# Patient Record
Sex: MALE | Race: OTHER | HISPANIC OR LATINO | URBAN - METROPOLITAN AREA
[De-identification: names, ages, dates, MRNs, and addresses within clinical notes are randomized per-mention and may not be internally consistent; named-entity substitution may affect disease eponyms.]

---

## 2018-06-27 RX ORDER — AZTREONAM 2 G
1 VIAL (EA) INJECTION
Qty: 0 | Refills: 0 | COMMUNITY
Start: 2018-06-27 | End: 2018-07-04

## 2018-07-01 ENCOUNTER — INPATIENT (INPATIENT)
Facility: HOSPITAL | Age: 73
LOS: 1 days | Discharge: HOME | End: 2018-07-03
Attending: INTERNAL MEDICINE | Admitting: INTERNAL MEDICINE
Payer: MEDICARE

## 2018-07-01 VITALS
DIASTOLIC BLOOD PRESSURE: 80 MMHG | TEMPERATURE: 98 F | RESPIRATION RATE: 14 BRPM | HEART RATE: 93 BPM | OXYGEN SATURATION: 100 % | SYSTOLIC BLOOD PRESSURE: 134 MMHG

## 2018-07-01 DIAGNOSIS — N17.9 ACUTE KIDNEY FAILURE, UNSPECIFIED: ICD-10-CM

## 2018-07-01 DIAGNOSIS — R55 SYNCOPE AND COLLAPSE: ICD-10-CM

## 2018-07-01 DIAGNOSIS — I10 ESSENTIAL (PRIMARY) HYPERTENSION: ICD-10-CM

## 2018-07-01 DIAGNOSIS — Z29.9 ENCOUNTER FOR PROPHYLACTIC MEASURES, UNSPECIFIED: ICD-10-CM

## 2018-07-01 DIAGNOSIS — N40.0 BENIGN PROSTATIC HYPERPLASIA WITHOUT LOWER URINARY TRACT SYMPTOMS: ICD-10-CM

## 2018-07-01 DIAGNOSIS — Z41.9 ENCOUNTER FOR PROCEDURE FOR PURPOSES OTHER THAN REMEDYING HEALTH STATE, UNSPECIFIED: Chronic | ICD-10-CM

## 2018-07-01 DIAGNOSIS — Z87.891 PERSONAL HISTORY OF NICOTINE DEPENDENCE: ICD-10-CM

## 2018-07-01 LAB
ALBUMIN SERPL ELPH-MCNC: 4.1 G/DL — SIGNIFICANT CHANGE UP (ref 3.3–5)
ALP SERPL-CCNC: 71 U/L — SIGNIFICANT CHANGE UP (ref 40–120)
ALT FLD-CCNC: 16 U/L — SIGNIFICANT CHANGE UP (ref 4–41)
APPEARANCE UR: CLEAR — SIGNIFICANT CHANGE UP
AST SERPL-CCNC: 20 U/L — SIGNIFICANT CHANGE UP (ref 4–40)
BASOPHILS # BLD AUTO: 0.04 K/UL — SIGNIFICANT CHANGE UP (ref 0–0.2)
BASOPHILS NFR BLD AUTO: 0.3 % — SIGNIFICANT CHANGE UP (ref 0–2)
BILIRUB SERPL-MCNC: 0.2 MG/DL — SIGNIFICANT CHANGE UP (ref 0.2–1.2)
BILIRUB UR-MCNC: NEGATIVE — SIGNIFICANT CHANGE UP
BLOOD UR QL VISUAL: NEGATIVE — SIGNIFICANT CHANGE UP
BUN SERPL-MCNC: 26 MG/DL — HIGH (ref 7–23)
CALCIUM SERPL-MCNC: 8.9 MG/DL — SIGNIFICANT CHANGE UP (ref 8.4–10.5)
CHLORIDE SERPL-SCNC: 101 MMOL/L — SIGNIFICANT CHANGE UP (ref 98–107)
CHOLEST SERPL-MCNC: 159 MG/DL — SIGNIFICANT CHANGE UP (ref 120–199)
CO2 SERPL-SCNC: 20 MMOL/L — LOW (ref 22–31)
COLOR SPEC: YELLOW — SIGNIFICANT CHANGE UP
CREAT ?TM UR-MCNC: 248 MG/DL — SIGNIFICANT CHANGE UP
CREAT SERPL-MCNC: 2.12 MG/DL — HIGH (ref 0.5–1.3)
EOSINOPHIL # BLD AUTO: 0.03 K/UL — SIGNIFICANT CHANGE UP (ref 0–0.5)
EOSINOPHIL NFR BLD AUTO: 0.2 % — SIGNIFICANT CHANGE UP (ref 0–6)
GLUCOSE SERPL-MCNC: 136 MG/DL — HIGH (ref 70–99)
GLUCOSE UR-MCNC: NEGATIVE — SIGNIFICANT CHANGE UP
HBA1C BLD-MCNC: 5.9 % — HIGH (ref 4–5.6)
HCT VFR BLD CALC: 42 % — SIGNIFICANT CHANGE UP (ref 39–50)
HDLC SERPL-MCNC: 29 MG/DL — LOW (ref 35–55)
HGB BLD-MCNC: 13.6 G/DL — SIGNIFICANT CHANGE UP (ref 13–17)
IMM GRANULOCYTES # BLD AUTO: 0.12 # — SIGNIFICANT CHANGE UP
IMM GRANULOCYTES NFR BLD AUTO: 0.8 % — SIGNIFICANT CHANGE UP (ref 0–1.5)
KETONES UR-MCNC: SIGNIFICANT CHANGE UP
LEUKOCYTE ESTERASE UR-ACNC: NEGATIVE — SIGNIFICANT CHANGE UP
LIPID PNL WITH DIRECT LDL SERPL: 102 MG/DL — SIGNIFICANT CHANGE UP
LYMPHOCYTES # BLD AUTO: 1.1 K/UL — SIGNIFICANT CHANGE UP (ref 1–3.3)
LYMPHOCYTES # BLD AUTO: 7.1 % — LOW (ref 13–44)
MCHC RBC-ENTMCNC: 27.2 PG — SIGNIFICANT CHANGE UP (ref 27–34)
MCHC RBC-ENTMCNC: 32.4 % — SIGNIFICANT CHANGE UP (ref 32–36)
MCV RBC AUTO: 84 FL — SIGNIFICANT CHANGE UP (ref 80–100)
MONOCYTES # BLD AUTO: 0.59 K/UL — SIGNIFICANT CHANGE UP (ref 0–0.9)
MONOCYTES NFR BLD AUTO: 3.8 % — SIGNIFICANT CHANGE UP (ref 2–14)
NEUTROPHILS # BLD AUTO: 13.63 K/UL — HIGH (ref 1.8–7.4)
NEUTROPHILS NFR BLD AUTO: 87.8 % — HIGH (ref 43–77)
NITRITE UR-MCNC: NEGATIVE — SIGNIFICANT CHANGE UP
NON-SQ EPI CELLS # UR AUTO: <1 — SIGNIFICANT CHANGE UP
NRBC # FLD: 0 — SIGNIFICANT CHANGE UP
PH UR: 5.5 — SIGNIFICANT CHANGE UP (ref 4.6–8)
PLATELET # BLD AUTO: 251 K/UL — SIGNIFICANT CHANGE UP (ref 150–400)
PMV BLD: 10.5 FL — SIGNIFICANT CHANGE UP (ref 7–13)
POTASSIUM SERPL-MCNC: 5 MMOL/L — SIGNIFICANT CHANGE UP (ref 3.5–5.3)
POTASSIUM SERPL-SCNC: 5 MMOL/L — SIGNIFICANT CHANGE UP (ref 3.5–5.3)
PROT SERPL-MCNC: 7.1 G/DL — SIGNIFICANT CHANGE UP (ref 6–8.3)
PROT UR-MCNC: 10 MG/DL — SIGNIFICANT CHANGE UP
RBC # BLD: 5 M/UL — SIGNIFICANT CHANGE UP (ref 4.2–5.8)
RBC # FLD: 15.1 % — HIGH (ref 10.3–14.5)
RBC CASTS # UR COMP ASSIST: SIGNIFICANT CHANGE UP (ref 0–?)
SODIUM SERPL-SCNC: 136 MMOL/L — SIGNIFICANT CHANGE UP (ref 135–145)
SODIUM UR-SCNC: 81 MMOL/L — SIGNIFICANT CHANGE UP
SP GR SPEC: 1.02 — SIGNIFICANT CHANGE UP (ref 1–1.04)
TRIGL SERPL-MCNC: 242 MG/DL — HIGH (ref 10–149)
TROPONIN T, HIGH SENSITIVITY: 14 NG/L — SIGNIFICANT CHANGE UP (ref ?–14)
TROPONIN T, HIGH SENSITIVITY: 15 NG/L — SIGNIFICANT CHANGE UP (ref ?–14)
TSH SERPL-MCNC: 3.11 UIU/ML — SIGNIFICANT CHANGE UP (ref 0.27–4.2)
UROBILINOGEN FLD QL: 1 MG/DL — SIGNIFICANT CHANGE UP
WBC # BLD: 15.51 K/UL — HIGH (ref 3.8–10.5)
WBC # FLD AUTO: 15.51 K/UL — HIGH (ref 3.8–10.5)
WBC UR QL: SIGNIFICANT CHANGE UP (ref 0–?)

## 2018-07-01 PROCEDURE — 71046 X-RAY EXAM CHEST 2 VIEWS: CPT | Mod: 26

## 2018-07-01 RX ORDER — SPIRONOLACTONE 25 MG/1
25 TABLET, FILM COATED ORAL DAILY
Qty: 0 | Refills: 0 | Status: DISCONTINUED | OUTPATIENT
Start: 2018-07-01 | End: 2018-07-01

## 2018-07-01 RX ORDER — LOSARTAN POTASSIUM 100 MG/1
25 TABLET, FILM COATED ORAL DAILY
Qty: 0 | Refills: 0 | Status: DISCONTINUED | OUTPATIENT
Start: 2018-07-01 | End: 2018-07-03

## 2018-07-01 RX ORDER — TAMSULOSIN HYDROCHLORIDE 0.4 MG/1
0.4 CAPSULE ORAL AT BEDTIME
Qty: 0 | Refills: 0 | Status: DISCONTINUED | OUTPATIENT
Start: 2018-07-01 | End: 2018-07-03

## 2018-07-01 RX ORDER — LOSARTAN POTASSIUM 100 MG/1
1 TABLET, FILM COATED ORAL
Qty: 0 | Refills: 0 | COMMUNITY

## 2018-07-01 RX ORDER — SODIUM CHLORIDE 9 MG/ML
1000 INJECTION INTRAMUSCULAR; INTRAVENOUS; SUBCUTANEOUS
Qty: 0 | Refills: 0 | Status: COMPLETED | OUTPATIENT
Start: 2018-07-01 | End: 2018-07-01

## 2018-07-01 RX ORDER — TAMSULOSIN HYDROCHLORIDE 0.4 MG/1
1 CAPSULE ORAL
Qty: 0 | Refills: 0 | COMMUNITY

## 2018-07-01 RX ADMIN — TAMSULOSIN HYDROCHLORIDE 0.4 MILLIGRAM(S): 0.4 CAPSULE ORAL at 21:15

## 2018-07-01 RX ADMIN — LOSARTAN POTASSIUM 25 MILLIGRAM(S): 100 TABLET, FILM COATED ORAL at 11:17

## 2018-07-01 RX ADMIN — SPIRONOLACTONE 25 MILLIGRAM(S): 25 TABLET, FILM COATED ORAL at 11:17

## 2018-07-01 RX ADMIN — Medication 1 TABLET(S): at 18:07

## 2018-07-01 RX ADMIN — SODIUM CHLORIDE 100 MILLILITER(S): 9 INJECTION INTRAMUSCULAR; INTRAVENOUS; SUBCUTANEOUS at 07:54

## 2018-07-01 NOTE — CONSULT NOTE ADULT - SUBJECTIVE AND OBJECTIVE BOX
Dr. Walton  Office [phone]  Cell [phone]  Diane CHRISTIE  Cell [phone]    HPI:  73 yo male PMHx HTN, Hypokalemia, Gout, BPH, s/p "prostate sx" 1 week ago, s/p passed out last night. Got admitted with syncope. Pt s/p "prostate biopsy" 1 week ago and was put on Bactrim DS x 7 days. He denies fever, chills, chest pain, sob, palpitations, nausea, vomiting, diarrhea, abdominal pain or dysuria. Has DEXTER on admission, denied use of NSAIDs or h/o renal dysfunction.      Allergies:  No Known Allergies      PAST MEDICAL & SURGICAL HISTORY:  BPH (benign prostatic hyperplasia)  Gout  Hypokalemia  Hypertension  Elective surgical procedure ;prostate procedure       Home Medications Reviewed    Hospital Medications:   MEDICATIONS  (STANDING):  losartan 25 milliGRAM(s) Oral daily  spironolactone 25 milliGRAM(s) Oral daily  tamsulosin 0.4 milliGRAM(s) Oral at bedtime  trimethoprim  160 mG/sulfamethoxazole 800 mG 1 Tablet(s) Oral two times a day      SOCIAL HISTORY:  Denies ETOh, Smoking,     FAMILY HISTORY:      REVIEW OF SYSTEMS:  CONSTITUTIONAL: No weakness, fevers or chills  EYES/ENT: No visual changes;  No vertigo or throat pain   NECK: No pain or stiffness  RESPIRATORY: No cough, wheezing, hemoptysis; No shortness of breath  CARDIOVASCULAR: No chest pain or palpitations.  GASTROINTESTINAL: No abdominal or epigastric pain. No nausea, vomiting, or hematemesis; No diarrhea or constipation. No melena or hematochezia.  GENITOURINARY: No dysuria, frequency, foamy urine, urinary urgency, incontinence or hematuria  NEUROLOGICAL: No numbness or weakness  SKIN: No itching, burning, rashes, or lesions   VASCULAR: No bilateral lower extremity edema.   All other review of systems is negative unless indicated above.    VITALS:  T(F): 97.8 (07-01-18 @ 09:15), Max: 98 (07-01-18 @ 06:39)  HR: 59 (07-01-18 @ 09:15)  BP: 148/69 (07-01-18 @ 09:15)  RR: 16 (07-01-18 @ 09:15)  SpO2: 100% (07-01-18 @ 09:15)  Wt(kg): --    Height (cm): 175.26 (07-01 @ 07:41)  Weight (kg): 99.8 (07-01 @ 07:41)  BMI (kg/m2): 32.5 (07-01 @ 07:41)  BSA (m2): 2.15 (07-01 @ 07:41)    PHYSICAL EXAM:  Constitutional: NAD  HEENT: anicteric sclera, oropharynx clear, MMM  Neck: No JVD  Respiratory: CTAB, no wheezes, rales or rhonchi  Cardiovascular: S1, S2, RRR  Gastrointestinal: BS+, soft, NT/ND  Extremities: No cyanosis or clubbing. No peripheral edema  Neurological: A/O x 3, no focal deficits  Psychiatric: Normal mood, normal affect  : No CVA tenderness. No lugo.   Skin: No rashes       LABS:  07-01    136  |  101  |  26<H>  ----------------------------<  136<H>  5.0   |  20<L>  |  2.12<H>    Ca    8.9      01 Jul 2018 04:15    TPro  7.1  /  Alb  4.1  /  TBili  0.2  /  DBili      /  AST  20  /  ALT  16  /  AlkPhos  71  07-01    Creatinine Trend: 2.12 <--                        13.6   15.51 )-----------( 251      ( 01 Jul 2018 04:15 )             42.0     Urine Studies:        RADIOLOGY & ADDITIONAL STUDIES:

## 2018-07-01 NOTE — H&P ADULT - RS GEN PE MLT RESP DETAILS PC
no rales/no wheezes/good air movement/airway patent/clear to auscultation bilaterally/breath sounds equal/no chest wall tenderness/no rhonchi/respirations non-labored

## 2018-07-01 NOTE — CONSULT NOTE ADULT - PROBLEM SELECTOR RECOMMENDATION 9
Etiology?  ATN sec to hemodynamic changes during syncope  Bactrim causing DEXTER  Recent h/o prostate biopsy, rule out obstruction  Get UA, Urine urea nitrogen, cr, Renal US, CBC with diff  Monitor renal function  Continue ARB at present, discontinue it if renal function worsens  Continue Bactrim at present

## 2018-07-01 NOTE — H&P ADULT - PROBLEM SELECTOR PLAN 1
tele monitor   cardiac enzymes x 2  Orthostatic blood pressures  IV Fluids hydration  DASH diet  TTE  UA and Urine Cx

## 2018-07-01 NOTE — H&P ADULT - NEUROLOGICAL DETAILS
sensation intact/cranial nerves intact/responds to verbal commands/alert and oriented x 3/responds to pain

## 2018-07-01 NOTE — H&P ADULT - ASSESSMENT
73 yo male PMHx HTN, Hypokalemia, Gout, BPH, s/p "prostate sx" 1 week ago, s/p diaphoresis and passed out last night admitted to tele for Syncope, r/o ACS and DEXTER.    +Syncope-->UA, Orthostatics, TTE, IVF hydration  +DEXTER--> IVF hydration, Monitor electrolytes, Trend BUN/Cr

## 2018-07-01 NOTE — ED ADULT TRIAGE NOTE - CHIEF COMPLAINT QUOTE
pt arrives to ed post 2 syncopal episode. Pt states "I started to pass out so my brother caught me". Pt denies any trauma. Pt states hx of HTN and takes potassium supplementation. Pt states "last time I passed out my potassium was low". Pt visiting from [place], drove himself up to [place]. Pt states stopped multiple times on route. Denies chest pain, SOB, palpations. No medical complaints at this time. EKG in progress. pt arrives to ed post 2 syncopal episodes. Pt states "I started to pass out so my brother caught me". Pt denies any trauma. Pt states hx of HTN and takes potassium supplementation. Pt states "last time I passed out my potassium was low". Pt visiting from [place], drove himself up to [place]. Pt states stopped multiple times on route. Denies chest pain, SOB, palpations. No medical complaints at this time. EKG in progress. Pt also states ETOH use today, 2 beers 2 shots at a family party. pt is a&ox3, clear speech.

## 2018-07-01 NOTE — ED ADULT NURSE NOTE - OBJECTIVE STATEMENT
Received pt to rm 28 with son at bedside. Pt smiling jovial affect.  st" Tonight while at party I was drinking had couple and while talking I just passed out ....I was out for few seconds....did not fall or hit head." As per Son...." This happen before when his potassium goes low....he drove up from [place] and has not taken his meds in 3 days." Pt denies chest pain, denies feeling weak or dizzy now. Pt educated to call for asst pt is fall risk precautions intiated. call bell at bedside. Pt verbalizing understanding to call for asst. Pt placed on cm=EKg done. Pt arrives with rt ac access(EMS) #20g in rt ac. Labs to follow. Arlene Cornejo at bedside.

## 2018-07-01 NOTE — ED PROVIDER NOTE - OBJECTIVE STATEMENT
72 M h/o htn, hypokalemia, p.w syncopal episode. Patient was at a party, had 2 beers, was chatting with a friend, suddenly felt sweaty and collapsed. Did not fall to floor, was caught by friend. LOC for a few seconds. No prior lightheadedness/chest pain/sob. No tongue biting/stool or urine incontinence. Currently denies any symptoms. Recently drove from [place], no leg pain or swelling. States this happened once a long time ago when he was diagnosed with hypokalemia. No recent illnesses/vomiting/diarrhea/fevers. 72 M h/o htn, hypokalemia, p/w syncopal episode. Patient was at a party, had 2 beers, was chatting with a friend, suddenly felt sweaty and collapsed. Did not fall to floor, was caught by friend. LOC for a few seconds. No prior lightheadedness/chest pain/sob. No tongue biting/stool or urine incontinence. Currently denies any symptoms. Recently drove from [place], no leg pain or swelling. States this happened once a long time ago when he was diagnosed with hypokalemia. No recent illnesses/vomiting/diarrhea/fevers.

## 2018-07-01 NOTE — CONSULT NOTE ADULT - ASSESSMENT
73 yo male PMHx HTN, Hypokalemia, Gout, BPH, s/p "prostate sx" 1 week ago, s/p passed out last night. Got admitted with syncope. Pt s/p "prostate biopsy" 1 week ago and was put on Bactrim DS x 7 days. He denies fever, chills, chest pain, sob, palpitations, nausea, vomiting, diarrhea, abdominal pain or dysuria. Has DEXTER

## 2018-07-01 NOTE — H&P ADULT - NSHPSOCIALHISTORY_GEN_ALL_CORE
Pt visiting [place] from [place], lives with son for now.   Smokes and drinks on occasion about 1 time /week. Denies drugs.

## 2018-07-01 NOTE — ED PROVIDER NOTE - MEDICAL DECISION MAKING DETAILS
72 M with syncopal episode, sweating prior but no other prodrome. concern for arrythmia. ekg, labs, cxr, likely admit for syncope w/u

## 2018-07-01 NOTE — ED ADULT NURSE NOTE - CHIEF COMPLAINT QUOTE
pt arrives to ed post 2 syncopal episodes. Pt states "I started to pass out so my brother caught me". Pt denies any trauma. Pt states hx of HTN and takes potassium supplementation. Pt states "last time I passed out my potassium was low". Pt visiting from [place], drove himself up to [place]. Pt states stopped multiple times on route. Denies chest pain, SOB, palpations. No medical complaints at this time. EKG in progress. Pt also states ETOH use today, 2 beers 2 shots at a family party. pt is a&ox3, clear speech.

## 2018-07-01 NOTE — H&P ADULT - ATTENDING COMMENTS
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

## 2018-07-01 NOTE — ED PROVIDER NOTE - ATTENDING CONTRIBUTION TO CARE
71 y/o M with h/o HTN, hypokalemia presents after a syncopal episode.  Pt was at a party tonight, had a couple beers, was standing and talking with someone when he started to feel hot.  (+)LOC, caught by friend and lowered to ground.  Pt recalls waking up with people around him.  No reported injuries.  Of note, pt lives in [place] and drove up to [place] to visit family.  Arrived earlier in the morning.  Denies any preceding chest pain, SOB, palpitations, back pain, abd pain, leg pain or swelling.  He is currently asymptomatic.  Pt reports a distant h/o syncope in the past and states he was told he had low potassium levels at the time.  Pt also reports not taking his blood pressure medication for the past 3 days as he wanted to be able to drink alcohol at the party (not a daily drinker).  Well appearing, sitting comfortably in stretcher, awake and alert, nontoxic.  VSS.  Lungs cta bl.  Cards nl S1/S2, RRR, no MRG.  Abd soft ntnd.  No pedal edema or calf tenderness, no unilateral swelling.  Plan for ekg, labs, cxr.  While hx consistent with vasovagal in setting of heat and alcohol use/poss dehydration, pt does have concerning EKG (bifasicular block), no previous for comparison and no recent cardiac work-up.  Will need admission for further syncope evaluation.

## 2018-07-01 NOTE — H&P ADULT - HISTORY OF PRESENT ILLNESS
73 yo male PMHx HTN, Hypokalemia, Gout, BPH, s/p "prostate sx" 1 week ago, s/p passed out last night. Pt was at the party, had 2 beers and 2 shots of liquor. While he was talking to his friend, he suddenly became diaphoretic and passed out for 30 secs. Friend caught him and eased him down on the chair. After 30 secs pt's LOC returned fully without incontinence and NO confusion and pt was brought to [facility] ED for evaluation. Pt s/p "prostate procedure" 1 week ago and was put on Bactrim DS x 7 days. Pt admits to non-compliance with all his meds for about 2-3 days. He denies fever, chills, chest pain, sob, palpitations, nausea, vomiting, diarrhea, abdominal pain or dysuria.

## 2018-07-02 LAB
ALBUMIN SERPL ELPH-MCNC: 3.8 G/DL — SIGNIFICANT CHANGE UP (ref 3.3–5)
ALP SERPL-CCNC: 68 U/L — SIGNIFICANT CHANGE UP (ref 40–120)
ALT FLD-CCNC: 15 U/L — SIGNIFICANT CHANGE UP (ref 4–41)
AST SERPL-CCNC: 19 U/L — SIGNIFICANT CHANGE UP (ref 4–40)
BASOPHILS # BLD AUTO: 0.05 K/UL — SIGNIFICANT CHANGE UP (ref 0–0.2)
BASOPHILS NFR BLD AUTO: 0.5 % — SIGNIFICANT CHANGE UP (ref 0–2)
BILIRUB SERPL-MCNC: 0.4 MG/DL — SIGNIFICANT CHANGE UP (ref 0.2–1.2)
BUN SERPL-MCNC: 25 MG/DL — HIGH (ref 7–23)
CALCIUM SERPL-MCNC: 8.9 MG/DL — SIGNIFICANT CHANGE UP (ref 8.4–10.5)
CHLORIDE SERPL-SCNC: 102 MMOL/L — SIGNIFICANT CHANGE UP (ref 98–107)
CO2 SERPL-SCNC: 21 MMOL/L — LOW (ref 22–31)
CREAT SERPL-MCNC: 1.56 MG/DL — HIGH (ref 0.5–1.3)
EOSINOPHIL # BLD AUTO: 0.26 K/UL — SIGNIFICANT CHANGE UP (ref 0–0.5)
EOSINOPHIL NFR BLD AUTO: 2.6 % — SIGNIFICANT CHANGE UP (ref 0–6)
GLUCOSE SERPL-MCNC: 99 MG/DL — SIGNIFICANT CHANGE UP (ref 70–99)
HCT VFR BLD CALC: 43 % — SIGNIFICANT CHANGE UP (ref 39–50)
HGB BLD-MCNC: 13.4 G/DL — SIGNIFICANT CHANGE UP (ref 13–17)
IMM GRANULOCYTES # BLD AUTO: 0.09 # — SIGNIFICANT CHANGE UP
IMM GRANULOCYTES NFR BLD AUTO: 0.9 % — SIGNIFICANT CHANGE UP (ref 0–1.5)
LYMPHOCYTES # BLD AUTO: 2.09 K/UL — SIGNIFICANT CHANGE UP (ref 1–3.3)
LYMPHOCYTES # BLD AUTO: 20.6 % — SIGNIFICANT CHANGE UP (ref 13–44)
MAGNESIUM SERPL-MCNC: 2 MG/DL — SIGNIFICANT CHANGE UP (ref 1.6–2.6)
MCHC RBC-ENTMCNC: 27.4 PG — SIGNIFICANT CHANGE UP (ref 27–34)
MCHC RBC-ENTMCNC: 31.2 % — LOW (ref 32–36)
MCV RBC AUTO: 87.9 FL — SIGNIFICANT CHANGE UP (ref 80–100)
MONOCYTES # BLD AUTO: 0.99 K/UL — HIGH (ref 0–0.9)
MONOCYTES NFR BLD AUTO: 9.8 % — SIGNIFICANT CHANGE UP (ref 2–14)
NEUTROPHILS # BLD AUTO: 6.66 K/UL — SIGNIFICANT CHANGE UP (ref 1.8–7.4)
NEUTROPHILS NFR BLD AUTO: 65.6 % — SIGNIFICANT CHANGE UP (ref 43–77)
NRBC # FLD: 0 — SIGNIFICANT CHANGE UP
PHOSPHATE SERPL-MCNC: 2.2 MG/DL — LOW (ref 2.5–4.5)
PLATELET # BLD AUTO: 231 K/UL — SIGNIFICANT CHANGE UP (ref 150–400)
PMV BLD: 11 FL — SIGNIFICANT CHANGE UP (ref 7–13)
POTASSIUM SERPL-MCNC: 4.4 MMOL/L — SIGNIFICANT CHANGE UP (ref 3.5–5.3)
POTASSIUM SERPL-SCNC: 4.4 MMOL/L — SIGNIFICANT CHANGE UP (ref 3.5–5.3)
PROT SERPL-MCNC: 6.6 G/DL — SIGNIFICANT CHANGE UP (ref 6–8.3)
RBC # BLD: 4.89 M/UL — SIGNIFICANT CHANGE UP (ref 4.2–5.8)
RBC # FLD: 15.1 % — HIGH (ref 10.3–14.5)
SODIUM SERPL-SCNC: 134 MMOL/L — LOW (ref 135–145)
SPECIMEN SOURCE: SIGNIFICANT CHANGE UP
SPECIMEN SOURCE: SIGNIFICANT CHANGE UP
WBC # BLD: 10.14 K/UL — SIGNIFICANT CHANGE UP (ref 3.8–10.5)
WBC # FLD AUTO: 10.14 K/UL — SIGNIFICANT CHANGE UP (ref 3.8–10.5)

## 2018-07-02 PROCEDURE — 93306 TTE W/DOPPLER COMPLETE: CPT | Mod: 26

## 2018-07-02 PROCEDURE — 70450 CT HEAD/BRAIN W/O DYE: CPT | Mod: 26

## 2018-07-02 PROCEDURE — 76775 US EXAM ABDO BACK WALL LIM: CPT | Mod: 26

## 2018-07-02 RX ADMIN — TAMSULOSIN HYDROCHLORIDE 0.4 MILLIGRAM(S): 0.4 CAPSULE ORAL at 21:15

## 2018-07-02 RX ADMIN — LOSARTAN POTASSIUM 25 MILLIGRAM(S): 100 TABLET, FILM COATED ORAL at 05:23

## 2018-07-02 RX ADMIN — Medication 1 TABLET(S): at 17:05

## 2018-07-02 RX ADMIN — Medication 1 TABLET(S): at 05:23

## 2018-07-02 NOTE — PROGRESS NOTE ADULT - ASSESSMENT
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

## 2018-07-02 NOTE — PROGRESS NOTE ADULT - ASSESSMENT
71 yo male PMHx HTN, Hypokalemia, Gout, BPH, s/p "prostate sx" 1 week ago, s/p diaphoresis and passed out last night admitted to tele for Syncope, r/o ACS and DEXTER.

## 2018-07-02 NOTE — PROGRESS NOTE ADULT - SUBJECTIVE AND OBJECTIVE BOX
Usman Candelario MD  Interventional Cardiology / Advance Heart Failure and Cardiac Transplant Specialist  [place] Office : [phone] [place] [address]  Tel:   [place] Office : [address] [facility] [address] [address]  Tel: [phone]  Cell : [phone]    Subjective : Pt lying in bed comfortable, not in distress, denies any chest pain or SOB  	  MEDICATIONS:  losartan 25 milliGRAM(s) Oral daily  tamsulosin 0.4 milliGRAM(s) Oral at bedtime    trimethoprim  160 mG/sulfamethoxazole 800 mG 1 Tablet(s) Oral two times a day                PHYSICAL EXAM:  T(C): 36.4 (07-02-18 @ 20:24), Max: 36.4 (07-02-18 @ 05:18)  HR: 73 (07-02-18 @ 20:24) (54 - 73)  BP: 133/68 (07-02-18 @ 20:24) (119/59 - 133/68)  RR: 17 (07-02-18 @ 20:24) (17 - 18)  SpO2: 95% (07-02-18 @ 20:24) (95% - 97%)  Wt(kg): --  I&O's Summary        Appearance: Normal	  HEENT:   Normal oral mucosa, PERRL, EOMI	  Cardiovascular: Normal S1 S2, No JVD, No murmurs, No edema  Respiratory: Lungs clear to auscultation	  Gastrointestinal:  Soft, Non-tender, + BS	  Extremities: Normal range of motion, No clubbing, cyanosis or edema                                    13.4   10.14 )-----------( 231      ( 02 Jul 2018 05:40 )             43.0     07-02    134<L>  |  102  |  25<H>  ----------------------------<  99  4.4   |  21<L>  |  1.56<H>    Ca    8.9      02 Jul 2018 05:40  Phos  2.2     07-02  Mg     2.0     07-02    TPro  6.6  /  Alb  3.8  /  TBili  0.4  /  DBili  x   /  AST  19  /  ALT  15  /  AlkPhos  68  07-02    proBNP:   Lipid Profile:   HgA1c:   TSH:

## 2018-07-02 NOTE — PROGRESS NOTE ADULT - SUBJECTIVE AND OBJECTIVE BOX
Dr. Walton (Nephrology)  Office [phone]  Cell [phone]  Diane CHRISTIE  Cell [phone]      Patient is a 72y old  Male who presents with a chief complaint of pt stated, 'they said I passed out' (01 Jul 2018 09:17)      Patient seen and examined at bedside. No chest pain/sob    VITALS:  T(F): 97.5 (07-02-18 @ 05:18), Max: 97.6 (07-01-18 @ 19:24)  HR: 54 (07-02-18 @ 05:18)  BP: 119/59 (07-02-18 @ 05:18)  RR: 18 (07-02-18 @ 05:18)  SpO2: 97% (07-02-18 @ 05:18)  Wt(kg): --        PHYSICAL EXAM:  Constitutional: NAD  Neck: No JVD  Respiratory: CTAB, no wheezes, rales or rhonchi  Cardiovascular: S1, S2, RRR  Gastrointestinal: BS+, soft, NT/ND  Extremities: No peripheral edema    Hospital Medications:   MEDICATIONS  (STANDING):  losartan 25 milliGRAM(s) Oral daily  tamsulosin 0.4 milliGRAM(s) Oral at bedtime  trimethoprim  160 mG/sulfamethoxazole 800 mG 1 Tablet(s) Oral two times a day      LABS:  07-02    134<L>  |  102  |  25<H>  ----------------------------<  99  4.4   |  21<L>  |  1.56<H>    Ca    8.9      02 Jul 2018 05:40  Phos  2.2     07-02  Mg     2.0     07-02    TPro  6.6  /  Alb  3.8  /  TBili  0.4  /  DBili      /  AST  19  /  ALT  15  /  AlkPhos  68  07-02    Creatinine Trend: 1.56 <--, 2.12 <--    Phosphorus Level, Serum: 2.2 mg/dL (07-02 @ 05:40)  Albumin, Serum: 3.8 g/dL (07-02 @ 05:40)                              13.4   10.14 )-----------( 231      ( 02 Jul 2018 05:40 )             43.0     Urine Studies:  Urinalysis - [07-01-18 @ 14:43]      Color YELLOW / Appearance CLEAR / SG 1.021 / pH 5.5      Gluc NEGATIVE / Ketone TRACE  / Bili NEGATIVE / Urobili 1       Blood NEGATIVE / Protein 10 / Leuk Est NEGATIVE / Nitrite NEGATIVE      RBC 0-2 / WBC 0-2 / Hyaline  / Gran  / Sq Epi  / Non Sq Epi  / Bacteria     Urine Creatinine 248.00      [07-01-18 @ 14:43]  Urine Sodium 81      [07-01-18 @ 14:43]    HbA1c 5.9      [07-01-18 @ 03:44]  TSH 3.11      [07-01-18 @ 04:15]  Lipid: chol 159, , HDL 29,       [07-01-18 @ 04:15]        RADIOLOGY & ADDITIONAL STUDIES:

## 2018-07-02 NOTE — PROGRESS NOTE ADULT - SUBJECTIVE AND OBJECTIVE BOX
chief complaint : admitted after syncopal episode        SUBJECTIVE / OVERNIGHT EVENTS: pt denies chest pain, shortness of breath, nausea, vomiting       MEDICATIONS  (STANDING):  losartan 25 milliGRAM(s) Oral daily  tamsulosin 0.4 milliGRAM(s) Oral at bedtime  trimethoprim  160 mG/sulfamethoxazole 800 mG 1 Tablet(s) Oral two times a day    MEDICATIONS  (PRN):      Vital Signs Last 24 Hrs  T(C): 36.4 (2018 05:18), Max: 36.4 (2018 19:24)  T(F): 97.5 (2018 05:18), Max: 97.6 (2018 19:24)  HR: 54 (2018 05:18) (54 - 55)  BP: 119/59 (2018 05:18) (119/59 - 135/74)  BP(mean): --  RR: 18 (2018 05:18) (18 - 18)  SpO2: 97% (2018 05:18) (96% - 97%)  CAPILLARY BLOOD GLUCOSE        I&O's Summary      Constitutional: No fever, fatigue  Skin: No rash.  Eyes: No recent vision problems or eye pain.  ENT: No congestion, ear pain, or sore throat.  Cardiovascular: No chest pain or palpation.  Respiratory: No cough, shortness of breath, congestion, or wheezing.  Gastrointestinal: No abdominal pain, nausea, vomiting, or diarrhea.  Genitourinary: No dysuria.  Musculoskeletal: No joint swelling.  Neurologic: No headache.    PHYSICAL EXAM:  GENERAL: NAD  EYES: EOMI, PERRLA  NECK: Supple, No JVD  CHEST/LUNG: dec breath sounds rt base   HEART:  S1 , S2 +  ABDOMEN: soft,bs+  EXTREMITIES:  no edema  NEUROLOGY:alert awake oriented       LABS:                        13.4   10.14 )-----------( 231      ( 2018 05:40 )             43.0     07-02    134<L>  |  102  |  25<H>  ----------------------------<  99  4.4   |  21<L>  |  1.56<H>    Ca    8.9      2018 05:40  Phos  2.2     07-02  Mg     2.0     07-02    TPro  6.6  /  Alb  3.8  /  TBili  0.4  /  DBili  x   /  AST  19  /  ALT  15  /  AlkPhos  68  07-02          Urinalysis Basic - ( 2018 14:43 )    Color: YELLOW / Appearance: CLEAR / S.021 / pH: 5.5  Gluc: NEGATIVE / Ketone: TRACE  / Bili: NEGATIVE / Urobili: 1 mg/dL   Blood: NEGATIVE / Protein: 10 mg/dL / Nitrite: NEGATIVE   Leuk Esterase: NEGATIVE / RBC: 0-2 / WBC 0-2   Sq Epi: x / Non Sq Epi: x / Bacteria: x        RADIOLOGY & ADDITIONAL TESTS:    Imaging Personally Reviewed:    Consultant(s) Notes Reviewed:      Care Discussed with Consultants/Other Providers:

## 2018-07-03 VITALS
OXYGEN SATURATION: 98 % | RESPIRATION RATE: 18 BRPM | TEMPERATURE: 98 F | HEART RATE: 57 BPM | DIASTOLIC BLOOD PRESSURE: 57 MMHG | SYSTOLIC BLOOD PRESSURE: 123 MMHG

## 2018-07-03 LAB
BUN SERPL-MCNC: 23 MG/DL — SIGNIFICANT CHANGE UP (ref 7–23)
CALCIUM SERPL-MCNC: 9.2 MG/DL — SIGNIFICANT CHANGE UP (ref 8.4–10.5)
CHLORIDE SERPL-SCNC: 102 MMOL/L — SIGNIFICANT CHANGE UP (ref 98–107)
CO2 SERPL-SCNC: 19 MMOL/L — LOW (ref 22–31)
CREAT SERPL-MCNC: 1.57 MG/DL — HIGH (ref 0.5–1.3)
GLUCOSE SERPL-MCNC: 92 MG/DL — SIGNIFICANT CHANGE UP (ref 70–99)
HCT VFR BLD CALC: 43.1 % — SIGNIFICANT CHANGE UP (ref 39–50)
HGB BLD-MCNC: 13.7 G/DL — SIGNIFICANT CHANGE UP (ref 13–17)
MCHC RBC-ENTMCNC: 27.3 PG — SIGNIFICANT CHANGE UP (ref 27–34)
MCHC RBC-ENTMCNC: 31.8 % — LOW (ref 32–36)
MCV RBC AUTO: 85.9 FL — SIGNIFICANT CHANGE UP (ref 80–100)
NRBC # FLD: 0 — SIGNIFICANT CHANGE UP
PLATELET # BLD AUTO: 239 K/UL — SIGNIFICANT CHANGE UP (ref 150–400)
PMV BLD: 10.8 FL — SIGNIFICANT CHANGE UP (ref 7–13)
POTASSIUM SERPL-MCNC: 4.5 MMOL/L — SIGNIFICANT CHANGE UP (ref 3.5–5.3)
POTASSIUM SERPL-SCNC: 4.5 MMOL/L — SIGNIFICANT CHANGE UP (ref 3.5–5.3)
RBC # BLD: 5.02 M/UL — SIGNIFICANT CHANGE UP (ref 4.2–5.8)
RBC # FLD: 14.9 % — HIGH (ref 10.3–14.5)
SODIUM SERPL-SCNC: 134 MMOL/L — LOW (ref 135–145)
WBC # BLD: 10.16 K/UL — SIGNIFICANT CHANGE UP (ref 3.8–10.5)
WBC # FLD AUTO: 10.16 K/UL — SIGNIFICANT CHANGE UP (ref 3.8–10.5)

## 2018-07-03 RX ORDER — ALLOPURINOL 300 MG
1 TABLET ORAL
Qty: 30 | Refills: 0 | OUTPATIENT
Start: 2018-07-03

## 2018-07-03 RX ORDER — POTASSIUM CHLORIDE 20 MEQ
1 PACKET (EA) ORAL
Qty: 0 | Refills: 0 | COMMUNITY

## 2018-07-03 RX ORDER — SPIRONOLACTONE 25 MG/1
1 TABLET, FILM COATED ORAL
Qty: 0 | Refills: 0 | COMMUNITY

## 2018-07-03 RX ORDER — ALLOPURINOL 300 MG
1 TABLET ORAL
Qty: 0 | Refills: 0 | COMMUNITY

## 2018-07-03 RX ADMIN — Medication 1 TABLET(S): at 05:45

## 2018-07-03 RX ADMIN — LOSARTAN POTASSIUM 25 MILLIGRAM(S): 100 TABLET, FILM COATED ORAL at 05:45

## 2018-07-03 NOTE — PROGRESS NOTE ADULT - ASSESSMENT
73 yo male PMHx HTN, Hypokalemia, Gout, BPH, s/p "prostate sx" 1 week ago, s/p diaphoresis and passed out last night admitted to tele for Syncope, r/o ACS and DEXTER.

## 2018-07-03 NOTE — PROGRESS NOTE ADULT - SUBJECTIVE AND OBJECTIVE BOX
Dr. Walton (Nephrology)  Office [phone]  Cell [phone]  Diane CHRISTIE  Cell [phone]      Patient is a 72y old  Male who presents with a chief complaint of pt stated, 'they said I passed out' (03 Jul 2018 12:46)      Patient seen and examined at bedside. No chest pain/sob    VITALS:  T(F): 97.8 (07-03-18 @ 05:42), Max: 97.8 (07-03-18 @ 05:42)  HR: 57 (07-03-18 @ 05:42)  BP: 123/57 (07-03-18 @ 05:42)  RR: 18 (07-03-18 @ 05:42)  SpO2: 98% (07-03-18 @ 05:42)  Wt(kg): --        PHYSICAL EXAM:  Constitutional: NAD  Neck: No JVD  Respiratory: CTAB, no wheezes, rales or rhonchi  Cardiovascular: S1, S2, RRR  Gastrointestinal: BS+, soft, NT/ND  Extremities: No peripheral edema    Hospital Medications:   MEDICATIONS  (STANDING):  losartan 25 milliGRAM(s) Oral daily  tamsulosin 0.4 milliGRAM(s) Oral at bedtime  trimethoprim  160 mG/sulfamethoxazole 800 mG 1 Tablet(s) Oral two times a day      LABS:  07-03    134<L>  |  102  |  23  ----------------------------<  92  4.5   |  19<L>  |  1.57<H>    Ca    9.2      03 Jul 2018 05:30  Phos  2.2     07-02  Mg     2.0     07-02    TPro  6.6  /  Alb  3.8  /  TBili  0.4  /  DBili      /  AST  19  /  ALT  15  /  AlkPhos  68  07-02    Creatinine Trend: 1.57 <--, 1.56 <--, 2.12 <--                                13.7   10.16 )-----------( 239      ( 03 Jul 2018 05:30 )             43.1     Urine Studies:  Urinalysis - [07-01-18 @ 14:43]      Color YELLOW / Appearance CLEAR / SG 1.021 / pH 5.5      Gluc NEGATIVE / Ketone TRACE  / Bili NEGATIVE / Urobili 1       Blood NEGATIVE / Protein 10 / Leuk Est NEGATIVE / Nitrite NEGATIVE      RBC 0-2 / WBC 0-2 / Hyaline  / Gran  / Sq Epi  / Non Sq Epi  / Bacteria     Urine Creatinine 248.00      [07-01-18 @ 14:43]  Urine Sodium 81      [07-01-18 @ 14:43]    HbA1c 5.9      [07-01-18 @ 03:44]  TSH 3.11      [07-01-18 @ 04:15]  Lipid: chol 159, , HDL 29,       [07-01-18 @ 04:15]        RADIOLOGY & ADDITIONAL STUDIES:

## 2018-07-03 NOTE — DISCHARGE NOTE ADULT - PROVIDER TOKENS
FREE:[LAST:[Follow up with your own medical doctor in one week],PHONE:[(   )    -],FAX:[(   )    -]]

## 2018-07-03 NOTE — DISCHARGE NOTE ADULT - PLAN OF CARE
No further syncopal episodes Do not drink alcohol in excessive amount   Stop taking aldactone   Follow up with your PMD within the next week to have your BP and labs rechecked   Stay hydrated with fluids stop aldactone   stay hydrated   Do not take any OTC motrin/ibuporfen at this time   Follow up with your PMD for lab work to monitor your kidney function Continue medication  Finish your bactrim antibiotic - take tonight and tomorrow morning then stop Continue medication   Diet Continue losartan Your potassium has been stable here  Follow up with your PMD for lab work next week

## 2018-07-03 NOTE — PROGRESS NOTE ADULT - PROBLEM SELECTOR PLAN 1
Fena<1% suggested prerenal,  stable, could be baseline, patient is from [place], no prior records   euvolemic on exam  monitor bmp

## 2018-07-03 NOTE — DISCHARGE NOTE ADULT - CARE PLAN
Principal Discharge DX:	Syncope  Goal:	No further syncopal episodes  Assessment and plan of treatment:	Do not drink alcohol in excessive amount   Stop taking aldactone   Follow up with your PMD within the next week to have your BP and labs rechecked   Stay hydrated with fluids  Secondary Diagnosis:	DETXER (acute kidney injury)  Assessment and plan of treatment:	stop aldactone   stay hydrated   Do not take any OTC motrin/ibuporfen at this time   Follow up with your PMD for lab work to monitor your kidney function  Secondary Diagnosis:	BPH (benign prostatic hyperplasia)  Assessment and plan of treatment:	Continue medication  Finish your bactrim antibiotic - take tonight and tomorrow morning then stop  Secondary Diagnosis:	Gout  Assessment and plan of treatment:	Continue medication   Diet  Secondary Diagnosis:	Hypertension  Assessment and plan of treatment:	Continue losartan  Secondary Diagnosis:	Hypokalemia  Assessment and plan of treatment:	Your potassium has been stable here  Follow up with your PMD for lab work next week

## 2018-07-03 NOTE — DISCHARGE NOTE ADULT - PATIENT PORTAL LINK FT
You can access the [facility] Patient Portal, offered by [facility], by registering with the following website: [email or web address]

## 2018-07-03 NOTE — DISCHARGE NOTE ADULT - MEDICATION SUMMARY - MEDICATIONS TO STOP TAKING
I will STOP taking the medications listed below when I get home from the hospital:    potassium chloride 10 mEq oral capsule, extended release  -- 1 cap(s) by mouth once a day    spironolactone 25 mg oral tablet  -- 1 tab(s) by mouth once a day

## 2018-07-03 NOTE — PROGRESS NOTE ADULT - SUBJECTIVE AND OBJECTIVE BOX
chief complaint : admitted after syncopal episode        SUBJECTIVE / OVERNIGHT EVENTS: pt denies chest pain, shortness of breath, nausea, vomiting     MEDICATIONS  (STANDING):  losartan 25 milliGRAM(s) Oral daily  tamsulosin 0.4 milliGRAM(s) Oral at bedtime  trimethoprim  160 mG/sulfamethoxazole 800 mG 1 Tablet(s) Oral two times a day    MEDICATIONS  (PRN):    Vital Signs Last 24 Hrs  T(C): 36.6 (2018 05:42), Max: 36.6 (2018 05:42)  T(F): 97.8 (2018 05:42), Max: 97.8 (2018 05:42)  HR: 57 (2018 05:42) (57 - 57)  BP: 123/57 (2018 05:42) (123/57 - 123/57)  BP(mean): --  RR: 18 (2018 05:42) (18 - 18)  SpO2: 98% (2018 05:42) (98% - 98%)    Constitutional: No fever, fatigue  Skin: No rash.  Eyes: No recent vision problems or eye pain.  ENT: No congestion, ear pain, or sore throat.  Cardiovascular: No chest pain or palpation.  Respiratory: No cough, shortness of breath, congestion, or wheezing.  Gastrointestinal: No abdominal pain, nausea, vomiting, or diarrhea.  Genitourinary: No dysuria.  Musculoskeletal: No joint swelling.  Neurologic: No headache.    PHYSICAL EXAM:  GENERAL: NAD  EYES: EOMI, PERRLA  NECK: Supple, No JVD  CHEST/LUNG: dec breath sounds rt base   HEART:  S1 , S2 +  ABDOMEN: soft,bs+  EXTREMITIES:  no edema  NEUROLOGY:alert awake oriented       LABS:      134<L>  |  102  |  23  ----------------------------<  92  4.5   |  19<L>  |  1.57<H>    Ca    9.2      2018 05:30  Phos  2.2     07-  Mg     2.0         TPro  6.6  /  Alb  3.8  /  TBili  0.4  /  DBili      /  AST  19  /  ALT  15  /  AlkPhos  68      Creatinine Trend: 1.57 <--, 1.56 <--, 2.12 <--                        13.7   10.16 )-----------( 239      ( 2018 05:30 )             43.1     Urine Studies:  Urinalysis Basic - ( 2018 14:43 )    Color: YELLOW / Appearance: CLEAR / S.021 / pH: 5.5  Gluc: NEGATIVE / Ketone: TRACE  / Bili: NEGATIVE / Urobili: 1 mg/dL   Blood: NEGATIVE / Protein: 10 mg/dL / Nitrite: NEGATIVE   Leuk Esterase: NEGATIVE / RBC: 0-2 / WBC 0-2   Sq Epi:  / Non Sq Epi:  / Bacteria:       Sodium, Random Urine: 81 mmol/L ( @ 14:43)  Creatinine, Random Urine: 248.00 mg/dL ( @ 14:43)          LIVER FUNCTIONS - ( 2018 05:40 )  Alb: 3.8 g/dL / Pro: 6.6 g/dL / ALK PHOS: 68 u/L / ALT: 15 u/L / AST: 19 u/L / GGT: x

## 2018-07-03 NOTE — DISCHARGE NOTE ADULT - HOSPITAL COURSE
71 yo male PMHx HTN, Hypokalemia, Gout, BPH, s/p "prostate sx" 1 week ago, s/p diaphoresis and passed out last night admitted to tele for Syncope, r/o ACS and DEXTER.Per patient he had 2 beers and 2 shots and is not accustomed to drinking      +Syncope-->UA, Orthostatics, TTE, IVF hydration  +DEXTER--> IVF hydration, Monitor electrolytes, Trend BUN/Cr  Followed by cardiology Dr Linda   Pt placed on telemetry and monitored with no arrhythmias noted   cardiac enzymes negative x2   CTH no acute findings   TTE EF 55%   Orthostatics Negative  IV Fluids, hydration given with improvement of symptoms, no further episodes    DEXTER (acute kidney injury).    IV Fluids hydration  Renal US- neg aldactone dc K+ dc , ok for losartan   Pt will follow up with his PMD next week     Hypertension.    BP monitored continue losartan 25mg daily   DASH diet  stop aldactone and K+     BPH (benign prostatic hyperplasia).    c/w flomax.   Bactrim to finish 7/4 am     Smoker within last 12 months.   Pt reports he will quit on his own.    Dispo Home

## 2018-07-03 NOTE — DISCHARGE NOTE ADULT - MEDICATION SUMMARY - MEDICATIONS TO TAKE
I will START or STAY ON the medications listed below when I get home from the hospital:    losartan 25 mg oral tablet  -- 1 tab(s) by mouth once a day  -- Indication: For Hypertension    Flomax 0.4 mg oral capsule  -- 1 cap(s) by mouth once a day  -- Indication: For BPH (benign prostatic hyperplasia)

## 2018-07-06 LAB
BACTERIA BLD CULT: SIGNIFICANT CHANGE UP
BACTERIA BLD CULT: SIGNIFICANT CHANGE UP

## 2019-12-02 NOTE — DISCHARGE NOTE ADULT - MEDICATION SUMMARY - MEDICATIONS TO CHANGE
I will SWITCH the dose or number of times a day I take the medications listed below when I get home from the hospital:  None
none

## 2023-01-03 NOTE — PATIENT PROFILE ADULT. - NS TRANSFER DISPOSITION PATIENT BELONGINGS
Pharmacy Consult-Vancomycin Dosing  Cj Cifuentes is a  70 y.o. male receiving vancomycin therapy.     Indication: Sepsis (urinary source, recent prostatectomy)  Consulting Provider: Hospitalist   ID Consult: N    Goal AUC: 400 - 600 mg/L*hr    Current Antimicrobial Therapy  Anti-Infectives (From admission, onward)      Ordered     Dose/Rate Route Frequency Start Stop    01/01/23 2006  piperacillin-tazobactam (ZOSYN) 3.375 g in iso-osmotic dextrose 50 ml (premix)        Ordering Provider: Becky Gamble APRN    3.375 g  over 4 Hours Intravenous Every 8 Hours 01/02/23 2100 01/07/23 2059    01/02/23 1145  vancomycin 1500 mg/500 mL 0.9% NS IVPB ([facility])        Ordering Provider: Gary Jaquez RPH    1,500 mg  over 90 Minutes Intravenous Every 12 Hours Scheduled 01/02/23 1245 01/09/23 0959    01/01/23 2032  vancomycin (dosing per levels)        Ordering Provider: Becky Gamble APRN     Does not apply Daily 01/02/23 0900 01/06/23 0859    01/01/23 2006  Pharmacy to dose vancomycin        Ordering Provider: Becky Gamble APRN     Does not apply Continuous PRN 01/01/23 2006 01/06/23 2005 01/01/23 1324  vancomycin 2000 mg/500 mL 0.9% NS IVPB ([facility])        Ordering Provider: Olayinka Arriaga DO    20 mg/kg × 105 kg Intravenous Once 01/01/23 1326 01/01/23 1944    01/01/23 1324  piperacillin-tazobactam (ZOSYN) 3.375 g in iso-osmotic dextrose 50 ml (premix)        Ordering Provider: Olayinka Arriaga DO    3.375 g  over 30 Minutes Intravenous Once 01/01/23 1326 01/01/23 1728          Allergies  Allergies as of 01/01/2023    (No Known Allergies)     Labs  Results from last 7 days   Lab Units 01/03/23  0602 01/02/23  0810 01/01/23  1322   BUN mg/dL 11 10 13   CREATININE mg/dL 0.56* 0.72* 1.09     Results from last 7 days   Lab Units 01/03/23  0602 01/02/23  0810 01/01/23  1322   WBC 10*3/mm3 30.46* 42.32* 47.57*     Evaluation of Dosing     Last Dose Received in the ED/Outside Facility: vancomycin  "2000mg (~19mg/kg) IV on 1/1 @ 1738.   Is Patient on Dialysis or Renal Replacement: No, but pt w/ ÁNGEL on admission (SCr 1.09, baseline ~0.75)    Ht - 175.3 cm (69\")  Wt - 112 kg (246 lb 4.8 oz)    Estimated Creatinine Clearance: 151.4 mL/min (A) (by C-G formula based on SCr of 0.56 mg/dL (L)).    Intake & Output (last 3 days)         12/31 0701  01/01 0700 01/01 0701  01/02 0700 01/02 0701 01/03 0700 01/03 0701  01/04 0700    P.O.   1230     Total Intake(mL/kg)   1230 (11)     Urine (mL/kg/hr)  2050 2075 (0.8)     Total Output  2050 2075     Net  -2050 -845                   Microbiology and Radiology  Microbiology Results (last 10 days)       Procedure Component Value - Date/Time    MRSA Screen, PCR (Inpatient) - Swab, Nares [[id number]]  (Normal) Collected: 01/02/23 0102    Lab Status: Final result Specimen: Swab from Nares Updated: 01/02/23 0910     MRSA PCR Negative    Narrative:      The negative predictive value of this diagnostic test is high and should only be used to consider de-escalating anti-MRSA therapy. A positive result may indicate colonization with MRSA and must be correlated clinically.  MRSA Negative    Urine Culture - Urine, Indwelling Urethral Catheter [[id number]]  (Abnormal) Collected: 01/01/23 1735    Lab Status: Preliminary result Specimen: Urine from Indwelling Urethral Catheter Updated: 01/02/23 2143     Urine Culture >100,000 CFU/mL Pseudomonas species    Narrative:      Colonization of the urinary tract without infection is common. Treatment is discouraged unless the patient is symptomatic, pregnant, or undergoing an invasive urologic procedure.    COVID PRE-OP / PRE-PROCEDURE SCREENING ORDER (NO ISOLATION) - Swab, Nasopharynx [[id number]]  (Normal) Collected: 01/01/23 1325    Lab Status: Final result Specimen: Swab from Nasopharynx Updated: 01/01/23 1357    Narrative:      The following orders were created for panel order COVID PRE-OP / PRE-PROCEDURE SCREENING ORDER (NO ISOLATION) - Swab, " Nasopharynx.  Procedure                               Abnormality         Status                     ---------                               -----------         ------                     COVID-19 and FLU A/B PCR...[[id number]]  Normal              Final result                 Please view results for these tests on the individual orders.    COVID-19 and FLU A/B PCR - Swab, Nasopharynx [[id number]]  (Normal) Collected: 01/01/23 1325    Lab Status: Final result Specimen: Swab from Nasopharynx Updated: 01/01/23 1357     COVID19 Not Detected     Influenza A PCR Not Detected     Influenza B PCR Not Detected    Narrative:      Fact sheet for providers: https://www.fda.gov/media/223247/download    Fact sheet for patients: https://www.fda.gov/media/069451/download    Test performed by PCR.    Blood Culture - Blood, Hand, Right [[id number]]  (Normal) Collected: 01/01/23 1322    Lab Status: Preliminary result Specimen: Blood from Hand, Right Updated: 01/02/23 1345     Blood Culture No growth at 24 hours    Blood Culture - Blood, Arm, Right [[id number]]  (Normal) Collected: 01/01/23 1322    Lab Status: Preliminary result Specimen: Blood from Arm, Right Updated: 01/02/23 1345     Blood Culture No growth at 24 hours          Reported Vancomycin Levels  Results from last 7 days   Lab Units 01/03/23  0602 01/02/23  0810   VANCOMYCIN RM mcg/mL 10.50 4.20*                [facility] AUC Calculation:    Current AUC: 315 mg/L*hr    Predicted Steady State AUC on Current Dose: 469 mg/L*hr  _________________________________    Predicted Steady State AUC on New Dose:  488 mg/L*hr    Assessment/Plan:  Pharmacy to dose vancomycin for sepsis in setting of recent prostatectomy. Goal -600 mg/L*hr.   Patient received loading dose of vancomycin 2000mg (~19mg/kg) IV on 1/1 @ 1738.   Renal function maintained at baseline based on labs from today.   MRSA PCR returned negative, but infection likely has a urinary source. WBC continues to trend down.  Preliminary urine culture growing >100k Pseudomonas. No growth to date in blood cultures. Susceptibilities have not returned yet, patient empirically on Zosyn in addition to vancomycin.   Vancomycin random level returned 1/3 @ 0602 and kinetics result in therapeutic AUC at goal.   Will continue current maintenance regimen of vancomycin 1500 mg IV q12h (~13 mg/kg).  Pharmacy will continue to monitor renal function, cultures and sensitivities, and clinical status to adjust regimen as necessary.    Gary Jaquez,  PharmAUDREY  01/03/23  07:17 EST         not applicable/with patient
